# Patient Record
Sex: FEMALE | Race: WHITE
[De-identification: names, ages, dates, MRNs, and addresses within clinical notes are randomized per-mention and may not be internally consistent; named-entity substitution may affect disease eponyms.]

---

## 2017-01-31 ENCOUNTER — HOSPITAL ENCOUNTER (EMERGENCY)
Dept: HOSPITAL 19 - COL.ER | Age: 42
Discharge: HOME | End: 2017-01-31
Payer: COMMERCIAL

## 2017-01-31 VITALS — WEIGHT: 141.32 LBS | HEIGHT: 65.98 IN | BODY MASS INDEX: 22.71 KG/M2

## 2017-01-31 VITALS — DIASTOLIC BLOOD PRESSURE: 59 MMHG | HEART RATE: 76 BPM | TEMPERATURE: 98.4 F | SYSTOLIC BLOOD PRESSURE: 129 MMHG

## 2017-01-31 DIAGNOSIS — K08.89: Primary | ICD-10-CM

## 2017-02-04 ENCOUNTER — HOSPITAL ENCOUNTER (EMERGENCY)
Dept: HOSPITAL 19 - COL.ER | Age: 42
Discharge: HOME | End: 2017-02-04
Payer: COMMERCIAL

## 2017-02-04 VITALS — HEART RATE: 73 BPM | DIASTOLIC BLOOD PRESSURE: 63 MMHG | TEMPERATURE: 98.6 F | SYSTOLIC BLOOD PRESSURE: 134 MMHG

## 2017-02-04 VITALS — WEIGHT: 141.32 LBS | BODY MASS INDEX: 22.71 KG/M2 | HEIGHT: 65.98 IN

## 2017-02-04 DIAGNOSIS — S63.601A: Primary | ICD-10-CM
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
## 2017-02-16 ENCOUNTER — HOSPITAL ENCOUNTER (EMERGENCY)
Dept: HOSPITAL 19 - COL.ER | Age: 42
Discharge: HOME | End: 2017-02-16
Payer: COMMERCIAL

## 2017-02-16 VITALS — WEIGHT: 141.32 LBS | HEIGHT: 65.98 IN | BODY MASS INDEX: 22.71 KG/M2

## 2017-02-16 VITALS — SYSTOLIC BLOOD PRESSURE: 106 MMHG | HEART RATE: 77 BPM | TEMPERATURE: 98.4 F | DIASTOLIC BLOOD PRESSURE: 74 MMHG

## 2017-02-16 DIAGNOSIS — K03.81: ICD-10-CM

## 2017-02-16 DIAGNOSIS — K08.89: ICD-10-CM

## 2017-02-16 DIAGNOSIS — R68.84: Primary | ICD-10-CM

## 2017-04-18 ENCOUNTER — HOSPITAL ENCOUNTER (EMERGENCY)
Dept: HOSPITAL 19 - COL.ER | Age: 42
LOS: 1 days | Discharge: HOME | End: 2017-04-19
Payer: COMMERCIAL

## 2017-04-18 VITALS — HEART RATE: 68 BPM | SYSTOLIC BLOOD PRESSURE: 110 MMHG | DIASTOLIC BLOOD PRESSURE: 58 MMHG | TEMPERATURE: 97.9 F

## 2017-04-18 VITALS — HEIGHT: 67.01 IN | BODY MASS INDEX: 22.98 KG/M2 | WEIGHT: 146.39 LBS
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
## 2017-04-18 DIAGNOSIS — S63.502A: Primary | ICD-10-CM
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
## 2017-06-20 ENCOUNTER — HOSPITAL ENCOUNTER (EMERGENCY)
Dept: HOSPITAL 19 - COL.ER | Age: 42
Discharge: HOME | End: 2017-06-20
Payer: COMMERCIAL

## 2017-06-20 VITALS — TEMPERATURE: 98.5 F | DIASTOLIC BLOOD PRESSURE: 85 MMHG | SYSTOLIC BLOOD PRESSURE: 141 MMHG

## 2017-06-20 VITALS — BODY MASS INDEX: 23.11 KG/M2 | WEIGHT: 147.27 LBS | HEIGHT: 67.01 IN

## 2017-06-20 VITALS — HEART RATE: 86 BPM

## 2017-06-20 DIAGNOSIS — S40.862A: ICD-10-CM

## 2017-06-20 DIAGNOSIS — S40.861A: ICD-10-CM

## 2017-06-20 DIAGNOSIS — S00.86XA: Primary | ICD-10-CM
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
## 2017-06-20 DIAGNOSIS — Z86.14: ICD-10-CM

## 2017-06-27 ENCOUNTER — HOSPITAL ENCOUNTER (EMERGENCY)
Dept: HOSPITAL 19 - COL.ER | Age: 42
Discharge: HOME | End: 2017-06-27
Payer: COMMERCIAL

## 2017-06-27 VITALS — TEMPERATURE: 98.2 F | SYSTOLIC BLOOD PRESSURE: 124 MMHG | DIASTOLIC BLOOD PRESSURE: 68 MMHG

## 2017-06-27 VITALS — HEIGHT: 67.01 IN | WEIGHT: 143.3 LBS | BODY MASS INDEX: 22.49 KG/M2

## 2017-06-27 VITALS — HEART RATE: 78 BPM
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
## 2017-06-27 DIAGNOSIS — S90.32XA: Primary | ICD-10-CM

## 2017-07-08 ENCOUNTER — HOSPITAL ENCOUNTER (EMERGENCY)
Dept: HOSPITAL 19 - COL.ER | Age: 42
Discharge: HOME | End: 2017-07-08
Payer: COMMERCIAL

## 2017-07-08 VITALS — BODY MASS INDEX: 24.39 KG/M2 | HEIGHT: 67.01 IN | WEIGHT: 155.43 LBS

## 2017-07-08 VITALS — TEMPERATURE: 97.9 F

## 2017-07-08 VITALS — DIASTOLIC BLOOD PRESSURE: 56 MMHG | SYSTOLIC BLOOD PRESSURE: 115 MMHG | HEART RATE: 79 BPM

## 2017-07-08 DIAGNOSIS — S90.121A: Primary | ICD-10-CM
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
## 2017-11-30 ENCOUNTER — HOSPITAL ENCOUNTER (EMERGENCY)
Dept: HOSPITAL 19 - COL.ER | Age: 42
Discharge: HOME | End: 2017-11-30
Payer: COMMERCIAL

## 2017-11-30 VITALS — SYSTOLIC BLOOD PRESSURE: 112 MMHG | TEMPERATURE: 98.3 F | HEART RATE: 78 BPM | DIASTOLIC BLOOD PRESSURE: 68 MMHG

## 2017-11-30 VITALS — HEIGHT: 65.98 IN | WEIGHT: 155.43 LBS | BODY MASS INDEX: 24.98 KG/M2
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
## 2017-11-30 DIAGNOSIS — S93.402A: Primary | ICD-10-CM
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
## 2017-12-16 ENCOUNTER — HOSPITAL ENCOUNTER (EMERGENCY)
Dept: HOSPITAL 19 - COL.ER | Age: 42
Discharge: HOME | End: 2017-12-16
Payer: SELF-PAY

## 2017-12-16 VITALS — DIASTOLIC BLOOD PRESSURE: 75 MMHG | TEMPERATURE: 97.5 F | SYSTOLIC BLOOD PRESSURE: 108 MMHG

## 2017-12-16 VITALS — WEIGHT: 156.31 LBS | BODY MASS INDEX: 25.12 KG/M2 | HEIGHT: 65.98 IN

## 2017-12-16 VITALS — HEART RATE: 98 BPM

## 2017-12-16 DIAGNOSIS — Z98.51: ICD-10-CM

## 2017-12-16 DIAGNOSIS — Z98.890: ICD-10-CM

## 2017-12-16 DIAGNOSIS — J20.9: Primary | ICD-10-CM

## 2017-12-19 ENCOUNTER — HOSPITAL ENCOUNTER (EMERGENCY)
Dept: HOSPITAL 19 - COL.ER | Age: 42
Discharge: HOME | End: 2017-12-19
Payer: COMMERCIAL

## 2017-12-19 VITALS — DIASTOLIC BLOOD PRESSURE: 57 MMHG | TEMPERATURE: 98.6 F | SYSTOLIC BLOOD PRESSURE: 118 MMHG

## 2017-12-19 VITALS — HEART RATE: 96 BPM

## 2017-12-19 VITALS — WEIGHT: 156.31 LBS | HEIGHT: 65.98 IN | BODY MASS INDEX: 25.12 KG/M2

## 2017-12-19 DIAGNOSIS — K52.9: Primary | ICD-10-CM

## 2017-12-19 DIAGNOSIS — F43.10: ICD-10-CM

## 2017-12-19 DIAGNOSIS — Z98.51: ICD-10-CM

## 2017-12-19 LAB
ADJUSTED CALCIUM: 8.9 MG/DL (ref 8.4–10.2)
ALBUMIN SERPL-MCNC: 3.8 GM/DL (ref 3.5–5)
ALP SERPL-CCNC: 60 U/L (ref 50–136)
ALT SERPL-CCNC: 23 U/L (ref 9–52)
ANION GAP SERPL CALC-SCNC: 9 MMOL/L (ref 7–16)
BASOPHILS # BLD: 0.1 10*3/UL (ref 0–0.2)
BASOPHILS NFR BLD AUTO: 0.3 % (ref 0–2)
BILIRUB SERPL-MCNC: 0.6 MG/DL (ref 0–1)
BUN SERPL-MCNC: 12 MG/DL (ref 7–17)
CALCIUM SERPL-MCNC: 8.7 MG/DL (ref 8.4–10.2)
CHLORIDE SERPL-SCNC: 106 MMOL/L (ref 98–107)
CO2 SERPL-SCNC: 22 MMOL/L (ref 22–30)
CREAT SERPL-SCNC: 0.6 MG/DL (ref 0.52–1.25)
EOSINOPHIL # BLD: 0.1 10*3/UL (ref 0–0.7)
EOSINOPHIL NFR BLD: 0.7 % (ref 0–4)
GLUCOSE SERPL-MCNC: 95 MG/DL (ref 74–106)
GRANULOCYTES # BLD AUTO: 77.2 % (ref 42.2–75.2)
HCT VFR BLD AUTO: 44.4 % (ref 37–47)
HGB BLD-MCNC: 14.7 G/DL (ref 12.5–16)
LYMPHOCYTES # BLD: 2.2 10*3/UL (ref 1.2–3.4)
LYMPHOCYTES NFR BLD: 13.3 % (ref 20–51)
MCH RBC QN AUTO: 31 PG (ref 27–31)
MCHC RBC AUTO-ENTMCNC: 33 G/DL (ref 33–37)
MCV RBC AUTO: 94 FL (ref 80–100)
MONOCYTES # BLD: 1.2 10*3/UL (ref 0.1–0.6)
MONOCYTES NFR BLD AUTO: 7.5 % (ref 1.7–9.3)
NEUTROPHILS # BLD: 12.6 10*3/UL (ref 1.4–6.5)
PH UR STRIP.AUTO: 5 [PH] (ref 5–8)
PLATELET # BLD AUTO: 359 K/MM3 (ref 130–400)
PMV BLD AUTO: 10.1 FL (ref 7.4–10.4)
POTASSIUM SERPL-SCNC: 4 MMOL/L (ref 3.4–5)
PROT SERPL-MCNC: 6.8 GM/DL (ref 6.4–8.2)
RBC # BLD AUTO: 4.72 M/MM3 (ref 4.1–5.3)
SODIUM SERPL-SCNC: 137 MMOL/L (ref 137–145)
SP GR UR STRIP.AUTO: 1.03 (ref 1–1.03)
SQUAMOUS # URNS: (no result) /HPF
URN COLLECT METHOD CLASS: (no result)
WBC # BLD AUTO: 16.4 K/MM3 (ref 4.8–10.8)
WBC #/AREA URNS HPF: (no result) /HPF

## 2018-01-07 ENCOUNTER — HOSPITAL ENCOUNTER (EMERGENCY)
Dept: HOSPITAL 19 - COL.ER | Age: 43
LOS: 1 days | Discharge: HOME | End: 2018-01-08
Payer: COMMERCIAL

## 2018-01-07 VITALS — TEMPERATURE: 98.2 F | SYSTOLIC BLOOD PRESSURE: 103 MMHG | DIASTOLIC BLOOD PRESSURE: 55 MMHG

## 2018-01-07 VITALS — HEIGHT: 65.98 IN | BODY MASS INDEX: 23.71 KG/M2 | WEIGHT: 147.51 LBS

## 2018-01-07 DIAGNOSIS — R07.89: ICD-10-CM

## 2018-01-07 DIAGNOSIS — M94.0: Primary | ICD-10-CM

## 2018-01-08 VITALS — HEART RATE: 58 BPM

## 2018-01-08 LAB
ALBUMIN SERPL-MCNC: 4.1 GM/DL (ref 3.5–5)
ALP SERPL-CCNC: 72 U/L (ref 50–136)
ALT SERPL-CCNC: 23 U/L (ref 9–52)
ANION GAP SERPL CALC-SCNC: 8 MMOL/L (ref 7–16)
AST SERPL-CCNC: 19 U/L (ref 15–37)
BASOPHILS # BLD: 0.1 10*3/UL (ref 0–0.2)
BASOPHILS NFR BLD AUTO: 0.6 % (ref 0–2)
BILIRUB SERPL-MCNC: 0.4 MG/DL (ref 0–1)
BUN SERPL-MCNC: 14 MG/DL (ref 7–17)
CALCIUM SERPL-MCNC: 9.5 MG/DL (ref 8.4–10.2)
CHLORIDE SERPL-SCNC: 104 MMOL/L (ref 98–107)
CO2 SERPL-SCNC: 26 MMOL/L (ref 22–30)
CREAT SERPL-SCNC: 0.65 MG/DL (ref 0.52–1.25)
CRP SERPL-MCNC: 0.7 MG/DL (ref 0–0.9)
EOSINOPHIL # BLD: 0.2 10*3/UL (ref 0–0.7)
EOSINOPHIL NFR BLD: 2.3 % (ref 0–4)
ERYTHROCYTE [DISTWIDTH] IN BLOOD BY AUTOMATED COUNT: 13 % (ref 11.5–14.5)
GLUCOSE SERPL-MCNC: 90 MG/DL (ref 74–106)
GRANULOCYTES # BLD AUTO: 47.1 % (ref 42.2–75.2)
HCT VFR BLD AUTO: 41.2 % (ref 37–47)
HGB BLD-MCNC: 13.8 G/DL (ref 12.5–16)
LYMPHOCYTES # BLD: 3 10*3/UL (ref 1.2–3.4)
LYMPHOCYTES NFR BLD: 37.4 % (ref 20–51)
MCH RBC QN AUTO: 32 PG (ref 27–31)
MCHC RBC AUTO-ENTMCNC: 34 G/DL (ref 33–37)
MCV RBC AUTO: 94 FL (ref 80–100)
MONOCYTES # BLD: 1 10*3/UL (ref 0.1–0.6)
MONOCYTES NFR BLD AUTO: 12.2 % (ref 1.7–9.3)
NEUTROPHILS # BLD: 3.8 10*3/UL (ref 1.4–6.5)
PLATELET # BLD AUTO: 356 K/MM3 (ref 130–400)
PMV BLD AUTO: 10 FL (ref 7.4–10.4)
POTASSIUM SERPL-SCNC: 4 MMOL/L (ref 3.4–5)
PROT SERPL-MCNC: 6.8 GM/DL (ref 6.4–8.2)
RBC # BLD AUTO: 4.37 M/MM3 (ref 4.1–5.3)
SODIUM SERPL-SCNC: 138 MMOL/L (ref 137–145)
TROPONIN I SERPL-MCNC: < 0.012 NG/ML (ref 0–0.03)

## 2018-02-11 ENCOUNTER — HOSPITAL ENCOUNTER (EMERGENCY)
Dept: HOSPITAL 19 - COL.ER | Age: 43
LOS: 1 days | Discharge: HOME | End: 2018-02-12
Payer: SELF-PAY

## 2018-02-11 VITALS — DIASTOLIC BLOOD PRESSURE: 75 MMHG | SYSTOLIC BLOOD PRESSURE: 129 MMHG | HEART RATE: 93 BPM | TEMPERATURE: 98.1 F

## 2018-02-11 VITALS — BODY MASS INDEX: 25.12 KG/M2 | HEIGHT: 65.98 IN | WEIGHT: 156.31 LBS

## 2018-02-11 DIAGNOSIS — B86: Primary | ICD-10-CM

## 2018-03-04 ENCOUNTER — HOSPITAL ENCOUNTER (EMERGENCY)
Dept: HOSPITAL 19 - COL.ER | Age: 43
Discharge: HOME | End: 2018-03-04
Payer: SELF-PAY

## 2018-03-04 VITALS — TEMPERATURE: 97.6 F

## 2018-03-04 VITALS — SYSTOLIC BLOOD PRESSURE: 107 MMHG | HEART RATE: 80 BPM | DIASTOLIC BLOOD PRESSURE: 66 MMHG

## 2018-03-04 VITALS — BODY MASS INDEX: 24.98 KG/M2 | HEIGHT: 65.98 IN | WEIGHT: 155.43 LBS

## 2018-03-04 DIAGNOSIS — K59.00: Primary | ICD-10-CM

## 2018-03-04 DIAGNOSIS — K58.9: ICD-10-CM

## 2018-03-04 LAB
ALBUMIN SERPL-MCNC: 3.7 GM/DL (ref 3.5–5)
ALP SERPL-CCNC: 75 U/L (ref 50–136)
ALT SERPL-CCNC: 23 U/L (ref 9–52)
ANION GAP SERPL CALC-SCNC: 7 MMOL/L (ref 7–16)
AST SERPL-CCNC: 12 U/L (ref 15–37)
BASOPHILS # BLD: 0 10*3/UL (ref 0–0.2)
BASOPHILS NFR BLD AUTO: 0.3 % (ref 0–2)
BILIRUB SERPL-MCNC: 0.1 MG/DL (ref 0–1)
BUN SERPL-MCNC: 11 MG/DL (ref 7–17)
CALCIUM SERPL-MCNC: 8.5 MG/DL (ref 8.4–10.2)
CHLORIDE SERPL-SCNC: 100 MMOL/L (ref 98–107)
CO2 SERPL-SCNC: 27 MMOL/L (ref 22–30)
CREAT SERPL-SCNC: 0.64 MG/DL (ref 0.52–1.25)
CRP SERPL-MCNC: 1.2 MG/DL (ref 0–0.9)
EOSINOPHIL # BLD: 0.3 10*3/UL (ref 0–0.7)
EOSINOPHIL NFR BLD: 2.5 % (ref 0–4)
ERYTHROCYTE [DISTWIDTH] IN BLOOD BY AUTOMATED COUNT: 12.7 % (ref 11.5–14.5)
GLUCOSE SERPL-MCNC: 79 MG/DL (ref 74–106)
GRANULOCYTES # BLD AUTO: 65.6 % (ref 42.2–75.2)
HCT VFR BLD AUTO: 43 % (ref 37–47)
HGB BLD-MCNC: 14.6 G/DL (ref 12.5–16)
LIPASE SERPL-CCNC: 231 U/L (ref 23–300)
LYMPHOCYTES # BLD: 2.5 10*3/UL (ref 1.2–3.4)
LYMPHOCYTES NFR BLD: 21.5 % (ref 20–51)
MCH RBC QN AUTO: 32 PG (ref 27–31)
MCHC RBC AUTO-ENTMCNC: 34 G/DL (ref 33–37)
MCV RBC AUTO: 93 FL (ref 80–100)
MONOCYTES # BLD: 1.1 10*3/UL (ref 0.1–0.6)
MONOCYTES NFR BLD AUTO: 9.6 % (ref 1.7–9.3)
NEUTROPHILS # BLD: 7.7 10*3/UL (ref 1.4–6.5)
PH UR STRIP.AUTO: 5 [PH] (ref 5–8)
PLATELET # BLD AUTO: 359 K/MM3 (ref 130–400)
PMV BLD AUTO: 10.5 FL (ref 7.4–10.4)
POTASSIUM SERPL-SCNC: 3.9 MMOL/L (ref 3.4–5)
PROT SERPL-MCNC: 6.5 GM/DL (ref 6.4–8.2)
RBC # BLD AUTO: 4.64 M/MM3 (ref 4.1–5.3)
RBC # UR: (no result) /HPF
SODIUM SERPL-SCNC: 134 MMOL/L (ref 137–145)
SP GR UR STRIP.AUTO: 1.02 (ref 1–1.03)
SQUAMOUS # URNS: (no result) /HPF
URN COLLECT METHOD CLASS: (no result)

## 2018-04-17 ENCOUNTER — HOSPITAL ENCOUNTER (EMERGENCY)
Dept: HOSPITAL 19 - COL.ER | Age: 43
Discharge: HOME | End: 2018-04-17
Payer: SELF-PAY

## 2018-04-17 VITALS — HEART RATE: 80 BPM

## 2018-04-17 VITALS — DIASTOLIC BLOOD PRESSURE: 61 MMHG | TEMPERATURE: 98.4 F | SYSTOLIC BLOOD PRESSURE: 116 MMHG

## 2018-04-17 VITALS — BODY MASS INDEX: 24.2 KG/M2 | HEIGHT: 65.98 IN | WEIGHT: 150.58 LBS

## 2018-04-17 DIAGNOSIS — M25.541: ICD-10-CM

## 2018-04-17 DIAGNOSIS — M25.571: ICD-10-CM

## 2018-04-17 DIAGNOSIS — M25.542: Primary | ICD-10-CM

## 2018-04-17 DIAGNOSIS — M25.572: ICD-10-CM

## 2018-04-17 LAB
ALBUMIN SERPL-MCNC: 3.2 GM/DL (ref 3.5–5)
ALP SERPL-CCNC: 56 U/L (ref 50–136)
ALT SERPL-CCNC: 28 U/L (ref 9–52)
ANION GAP SERPL CALC-SCNC: 11 MMOL/L (ref 7–16)
AST SERPL-CCNC: 14 U/L (ref 15–37)
BASOPHILS # BLD: 0 10*3/UL (ref 0–0.2)
BASOPHILS NFR BLD AUTO: 0.4 % (ref 0–2)
BILIRUB SERPL-MCNC: 0.1 MG/DL (ref 0–1)
BUN SERPL-MCNC: 9 MG/DL (ref 7–17)
CALCIUM SERPL-MCNC: 8.2 MG/DL (ref 8.4–10.2)
CHLORIDE SERPL-SCNC: 106 MMOL/L (ref 98–107)
CO2 SERPL-SCNC: 23 MMOL/L (ref 22–30)
CREAT SERPL-SCNC: 0.6 MG/DL (ref 0.52–1.25)
CRP SERPL-MCNC: < 0.5 MG/DL (ref 0–0.9)
EOSINOPHIL # BLD: 0.1 10*3/UL (ref 0–0.7)
EOSINOPHIL NFR BLD: 1.2 % (ref 0–4)
ERYTHROCYTE [DISTWIDTH] IN BLOOD BY AUTOMATED COUNT: 13 % (ref 11.5–14.5)
ERYTHROCYTE [SEDIMENTATION RATE] IN BLOOD: 1 MM/HR (ref 0–20)
GLUCOSE SERPL-MCNC: 86 MG/DL (ref 74–106)
GRANULOCYTES # BLD AUTO: 60.5 % (ref 42.2–75.2)
HCT VFR BLD AUTO: 38.2 % (ref 37–47)
HGB BLD-MCNC: 12.8 G/DL (ref 12.5–16)
LYMPHOCYTES # BLD: 2.4 10*3/UL (ref 1.2–3.4)
LYMPHOCYTES NFR BLD: 26.5 % (ref 20–51)
MCH RBC QN AUTO: 31 PG (ref 27–31)
MCHC RBC AUTO-ENTMCNC: 34 G/DL (ref 33–37)
MCV RBC AUTO: 91 FL (ref 80–100)
MONOCYTES # BLD: 1 10*3/UL (ref 0.1–0.6)
MONOCYTES NFR BLD AUTO: 11 % (ref 1.7–9.3)
NEUTROPHILS # BLD: 5.5 10*3/UL (ref 1.4–6.5)
PLATELET # BLD AUTO: 302 K/MM3 (ref 130–400)
PMV BLD AUTO: 10.5 FL (ref 7.4–10.4)
POTASSIUM SERPL-SCNC: 3.3 MMOL/L (ref 3.4–5)
PROT SERPL-MCNC: 6 GM/DL (ref 6.4–8.2)
RBC # BLD AUTO: 4.18 M/MM3 (ref 4.1–5.3)
SODIUM SERPL-SCNC: 140 MMOL/L (ref 137–145)
URATE SERPL-MCNC: 4.4 MG/DL (ref 2.5–6.2)

## 2018-04-18 ENCOUNTER — HOSPITAL ENCOUNTER (EMERGENCY)
Dept: HOSPITAL 19 - COL.ER | Age: 43
Discharge: HOME | End: 2018-04-18
Payer: SELF-PAY

## 2018-04-18 VITALS — HEIGHT: 65.98 IN | BODY MASS INDEX: 18.53 KG/M2 | WEIGHT: 115.3 LBS

## 2018-04-18 VITALS — HEART RATE: 69 BPM

## 2018-04-18 VITALS — TEMPERATURE: 98.1 F | SYSTOLIC BLOOD PRESSURE: 129 MMHG | DIASTOLIC BLOOD PRESSURE: 67 MMHG

## 2018-04-18 DIAGNOSIS — Z87.442: ICD-10-CM

## 2018-04-18 DIAGNOSIS — Z79.52: ICD-10-CM

## 2018-04-18 DIAGNOSIS — E87.6: Primary | ICD-10-CM

## 2018-04-18 DIAGNOSIS — Z98.51: ICD-10-CM

## 2018-04-18 DIAGNOSIS — R10.9: ICD-10-CM

## 2018-04-18 LAB
ANION GAP SERPL CALC-SCNC: 10 MMOL/L (ref 7–16)
BASOPHILS # BLD: 0.1 10*3/UL (ref 0–0.2)
BASOPHILS NFR BLD AUTO: 0.3 % (ref 0–2)
BUN SERPL-MCNC: 7 MG/DL (ref 7–17)
CALCIUM SERPL-MCNC: 8.9 MG/DL (ref 8.4–10.2)
CHLORIDE SERPL-SCNC: 107 MMOL/L (ref 98–107)
CO2 SERPL-SCNC: 24 MMOL/L (ref 22–30)
CREAT SERPL-SCNC: 0.64 MG/DL (ref 0.52–1.25)
EOSINOPHIL # BLD: 0.1 10*3/UL (ref 0–0.7)
EOSINOPHIL NFR BLD: 0.4 % (ref 0–4)
ERYTHROCYTE [DISTWIDTH] IN BLOOD BY AUTOMATED COUNT: 12.9 % (ref 11.5–14.5)
GLUCOSE SERPL-MCNC: 77 MG/DL (ref 74–106)
GRANULOCYTES # BLD AUTO: 64.8 % (ref 42.2–75.2)
HCT VFR BLD AUTO: 41.5 % (ref 37–47)
HGB BLD-MCNC: 14.2 G/DL (ref 12.5–16)
LYMPHOCYTES # BLD: 4 10*3/UL (ref 1.2–3.4)
LYMPHOCYTES NFR BLD: 26.1 % (ref 20–51)
MCH RBC QN AUTO: 31 PG (ref 27–31)
MCHC RBC AUTO-ENTMCNC: 34 G/DL (ref 33–37)
MCV RBC AUTO: 91 FL (ref 80–100)
MONOCYTES # BLD: 1.2 10*3/UL (ref 0.1–0.6)
MONOCYTES NFR BLD AUTO: 7.8 % (ref 1.7–9.3)
NEUTROPHILS # BLD: 10 10*3/UL (ref 1.4–6.5)
PH UR STRIP.AUTO: 5 [PH] (ref 5–8)
PLATELET # BLD AUTO: 313 K/MM3 (ref 130–400)
PMV BLD AUTO: 10.6 FL (ref 7.4–10.4)
POTASSIUM SERPL-SCNC: 3 MMOL/L (ref 3.4–5)
RBC # BLD AUTO: 4.57 M/MM3 (ref 4.1–5.3)
RBC # UR: (no result) /HPF
SODIUM SERPL-SCNC: 140 MMOL/L (ref 137–145)
SP GR UR STRIP.AUTO: 1.02 (ref 1–1.03)
SQUAMOUS # URNS: (no result) /HPF
URINE LEUKOCYTE ESTERASE: (no result)
URN COLLECT METHOD CLASS: (no result)

## 2018-05-04 ENCOUNTER — HOSPITAL ENCOUNTER (EMERGENCY)
Dept: HOSPITAL 19 - COL.ER | Age: 43
Discharge: LEFT BEFORE DISCHARGE | End: 2018-05-04
Payer: SELF-PAY

## 2018-05-04 VITALS — SYSTOLIC BLOOD PRESSURE: 118 MMHG | HEART RATE: 87 BPM | DIASTOLIC BLOOD PRESSURE: 57 MMHG | TEMPERATURE: 98.3 F

## 2018-05-04 VITALS — HEIGHT: 65.98 IN | BODY MASS INDEX: 24.31 KG/M2 | WEIGHT: 151.24 LBS

## 2018-05-04 DIAGNOSIS — R20.2: ICD-10-CM

## 2018-05-04 DIAGNOSIS — M79.605: Primary | ICD-10-CM

## 2018-05-04 DIAGNOSIS — Z79.52: ICD-10-CM

## 2018-05-20 ENCOUNTER — HOSPITAL ENCOUNTER (EMERGENCY)
Dept: HOSPITAL 19 - COL.ER | Age: 43
Discharge: HOME | End: 2018-05-20
Payer: SELF-PAY

## 2018-05-20 VITALS — HEART RATE: 70 BPM

## 2018-05-20 VITALS — DIASTOLIC BLOOD PRESSURE: 58 MMHG | TEMPERATURE: 97.6 F | SYSTOLIC BLOOD PRESSURE: 131 MMHG

## 2018-05-20 VITALS — WEIGHT: 149.25 LBS | HEIGHT: 65.98 IN | BODY MASS INDEX: 23.99 KG/M2

## 2018-05-20 DIAGNOSIS — K58.9: ICD-10-CM

## 2018-05-20 DIAGNOSIS — M25.561: Primary | ICD-10-CM

## 2018-05-20 DIAGNOSIS — Z98.51: ICD-10-CM

## 2018-05-20 PROCEDURE — L1846 KO W ADJ FLEX/EXT ROTAT MOLD: HCPCS

## 2018-06-04 ENCOUNTER — HOSPITAL ENCOUNTER (OUTPATIENT)
Dept: HOSPITAL 19 - COL.RAD | Age: 43
End: 2018-06-04
Attending: NURSE PRACTITIONER
Payer: SELF-PAY

## 2018-06-04 DIAGNOSIS — M25.561: Primary | ICD-10-CM

## 2018-11-05 ENCOUNTER — HOSPITAL ENCOUNTER (EMERGENCY)
Dept: HOSPITAL 19 - COL.ER | Age: 43
Discharge: HOME | End: 2018-11-05
Payer: SELF-PAY

## 2018-11-05 VITALS — WEIGHT: 125.22 LBS | BODY MASS INDEX: 20.12 KG/M2 | HEIGHT: 65.98 IN

## 2018-11-05 VITALS — TEMPERATURE: 102.6 F | SYSTOLIC BLOOD PRESSURE: 113 MMHG | DIASTOLIC BLOOD PRESSURE: 56 MMHG

## 2018-11-05 VITALS — HEART RATE: 95 BPM

## 2018-11-05 DIAGNOSIS — K21.9: ICD-10-CM

## 2018-11-05 DIAGNOSIS — F43.10: ICD-10-CM

## 2018-11-05 DIAGNOSIS — F32.9: ICD-10-CM

## 2018-11-05 DIAGNOSIS — F41.9: ICD-10-CM

## 2018-11-05 DIAGNOSIS — J02.0: Primary | ICD-10-CM

## 2019-01-12 ENCOUNTER — HOSPITAL ENCOUNTER (EMERGENCY)
Dept: HOSPITAL 19 - COL.ER | Age: 44
Discharge: HOME | End: 2019-01-12
Payer: SELF-PAY

## 2019-01-12 VITALS — HEIGHT: 65.98 IN | WEIGHT: 115.3 LBS | BODY MASS INDEX: 18.53 KG/M2

## 2019-01-12 VITALS — DIASTOLIC BLOOD PRESSURE: 68 MMHG | TEMPERATURE: 98.7 F | SYSTOLIC BLOOD PRESSURE: 120 MMHG

## 2019-01-12 VITALS — HEART RATE: 74 BPM
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
## 2019-01-12 DIAGNOSIS — Z98.51: ICD-10-CM

## 2019-01-12 DIAGNOSIS — S70.01XA: Primary | ICD-10-CM
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
## 2019-08-02 ENCOUNTER — HOSPITAL ENCOUNTER (EMERGENCY)
Dept: HOSPITAL 19 - COL.ER | Age: 44
LOS: 1 days | Discharge: HOME | End: 2019-08-03
Payer: SELF-PAY

## 2019-08-02 VITALS — DIASTOLIC BLOOD PRESSURE: 74 MMHG | SYSTOLIC BLOOD PRESSURE: 110 MMHG | TEMPERATURE: 98.7 F

## 2019-08-02 VITALS — BODY MASS INDEX: 18.55 KG/M2 | HEIGHT: 67.01 IN | WEIGHT: 118.17 LBS

## 2019-08-02 DIAGNOSIS — K50.90: ICD-10-CM

## 2019-08-02 DIAGNOSIS — R21: Primary | ICD-10-CM

## 2019-08-02 DIAGNOSIS — F17.210: ICD-10-CM

## 2019-08-03 VITALS — HEART RATE: 84 BPM

## 2019-12-09 ENCOUNTER — HOSPITAL ENCOUNTER (EMERGENCY)
Dept: HOSPITAL 19 - COL.ER | Age: 44
Discharge: HOME | End: 2019-12-09
Payer: SELF-PAY

## 2019-12-09 VITALS — BODY MASS INDEX: 18.55 KG/M2 | HEIGHT: 67.01 IN | WEIGHT: 118.17 LBS

## 2019-12-09 VITALS — SYSTOLIC BLOOD PRESSURE: 110 MMHG | TEMPERATURE: 98.3 F | DIASTOLIC BLOOD PRESSURE: 69 MMHG

## 2019-12-09 VITALS — HEART RATE: 88 BPM
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
## 2019-12-09 DIAGNOSIS — K58.9: ICD-10-CM

## 2019-12-09 DIAGNOSIS — F32.9: ICD-10-CM

## 2019-12-09 DIAGNOSIS — S80.12XA: ICD-10-CM

## 2019-12-09 DIAGNOSIS — S80.02XA: Primary | ICD-10-CM

## 2019-12-09 DIAGNOSIS — F43.10: ICD-10-CM
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
## 2021-04-19 ENCOUNTER — HOSPITAL ENCOUNTER (OUTPATIENT)
Dept: HOSPITAL 63 - MAMMO | Age: 46
End: 2021-04-19
Attending: FAMILY MEDICINE
Payer: COMMERCIAL

## 2021-04-19 DIAGNOSIS — Z12.31: Primary | ICD-10-CM

## 2021-04-19 PROCEDURE — 77067 SCR MAMMO BI INCL CAD: CPT

## 2021-04-19 NOTE — RAD
EXAM: Bilateral screening mammogram.



HISTORY: 45-year-old female presents for screening mammography.



TECHNIQUE: Full-field digital craniocaudal and mediolateral oblique views of both breasts are obtaine
d for evaluation. Computer aided detection was applied.



COMPARISON: 3/17/2020



BREAST PARENCHYMAL DENSITY: Level D - Extremely dense.



FINDINGS: There is no new suspicious mass, microcalcification or region of architectural distortion. 
There are stable areas of asymmetry and nodularity within both breasts, allowing for differences in t
echnique. 



IMPRESSION: BI-RADS Category 2: Benign finding(s). 



RECOMMENDATION: Annual mammography is recommended.



If your mammogram demonstrates that you have dense breast tissue, which could hide abnormalities, and
 if you have other risk factors for breast cancer that have been identified, you might benefit from s
upplemental screening tests that may be suggested by your ordering physician.  Dense breast tissue, i
n and of itself, is a relatively common condition.  This information is not provided to cause undue c
oncern, but rather to raise your awareness and to promote discussion with your physician regarding th
e presence of other risk factors, in addition to dense breast tissue. A report of your mammography re
sults will be sent to you and your physician.  You should contact your physician if you have any ques
tions or concerns regarding this report.  



Mammography is a sensitive method for finding small breast cancers, but it does not detect them all a
nd is not a substitute for careful clinical examination.  A negative mammogram does not negate a clin
ically suspicious finding and should not result in delay in biopsying a clinically suspicious abnorma
lity.



[employer]RS compliance statement -  Patient information was entered into a reminder system with a target due
 date for the next mammogram. 



"Our facility is accredited by the [employer] Mammography Program."



Electronically signed by: Emily Escobedo MD (4/19/2021 1:56 PM) [id number]